# Patient Record
Sex: MALE | Race: WHITE
[De-identification: names, ages, dates, MRNs, and addresses within clinical notes are randomized per-mention and may not be internally consistent; named-entity substitution may affect disease eponyms.]

---
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
## 2023-08-31 ENCOUNTER — APPOINTMENT (OUTPATIENT)
Dept: URGENT CARE | Facility: CLINIC | Age: 23
End: 2023-08-31

## 2023-09-27 ENCOUNTER — HOSPITAL ENCOUNTER (EMERGENCY)
Facility: MEDICAL CENTER | Age: 23
End: 2023-09-27
Attending: EMERGENCY MEDICINE

## 2023-09-27 VITALS
WEIGHT: 172.4 LBS | SYSTOLIC BLOOD PRESSURE: 136 MMHG | TEMPERATURE: 98.4 F | HEART RATE: 69 BPM | DIASTOLIC BLOOD PRESSURE: 92 MMHG | HEIGHT: 69 IN | RESPIRATION RATE: 18 BRPM | OXYGEN SATURATION: 98 % | BODY MASS INDEX: 25.53 KG/M2

## 2023-09-27 DIAGNOSIS — L30.9 DERMATITIS: ICD-10-CM

## 2023-09-27 DIAGNOSIS — N34.2 URETHRITIS: ICD-10-CM

## 2023-09-27 PROCEDURE — 700111 HCHG RX REV CODE 636 W/ 250 OVERRIDE (IP): Mod: JZ | Performed by: EMERGENCY MEDICINE

## 2023-09-27 PROCEDURE — A9270 NON-COVERED ITEM OR SERVICE: HCPCS | Performed by: EMERGENCY MEDICINE

## 2023-09-27 PROCEDURE — 99284 EMERGENCY DEPT VISIT MOD MDM: CPT

## 2023-09-27 PROCEDURE — 700102 HCHG RX REV CODE 250 W/ 637 OVERRIDE(OP): Performed by: EMERGENCY MEDICINE

## 2023-09-27 PROCEDURE — 87491 CHLMYD TRACH DNA AMP PROBE: CPT

## 2023-09-27 PROCEDURE — 96372 THER/PROPH/DIAG INJ SC/IM: CPT

## 2023-09-27 PROCEDURE — 87591 N.GONORRHOEAE DNA AMP PROB: CPT

## 2023-09-27 RX ORDER — DOXYCYCLINE 100 MG/1
100 TABLET ORAL ONCE
Status: COMPLETED | OUTPATIENT
Start: 2023-09-27 | End: 2023-09-27

## 2023-09-27 RX ORDER — TRIAMCINOLONE ACETONIDE 0.25 MG/G
1 CREAM TOPICAL 2 TIMES DAILY
Qty: 80 G | Refills: 0 | Status: SHIPPED | OUTPATIENT
Start: 2023-09-27 | End: 2023-10-04

## 2023-09-27 RX ORDER — CEFTRIAXONE 500 MG/1
500 INJECTION, POWDER, FOR SOLUTION INTRAMUSCULAR; INTRAVENOUS ONCE
Status: COMPLETED | OUTPATIENT
Start: 2023-09-27 | End: 2023-09-27

## 2023-09-27 RX ORDER — DOXYCYCLINE 100 MG/1
100 CAPSULE ORAL 2 TIMES DAILY
Qty: 14 CAPSULE | Refills: 0 | Status: ACTIVE | OUTPATIENT
Start: 2023-09-27 | End: 2023-10-04

## 2023-09-27 RX ADMIN — DOXYCYCLINE 100 MG: 100 TABLET, FILM COATED ORAL at 20:09

## 2023-09-27 RX ADMIN — PENICILLIN G BENZATHINE 2.4 MILLION UNITS: 1200000 INJECTION, SUSPENSION INTRAMUSCULAR at 20:09

## 2023-09-27 RX ADMIN — CEFTRIAXONE SODIUM 500 MG: 500 INJECTION, POWDER, FOR SOLUTION INTRAMUSCULAR; INTRAVENOUS at 20:09

## 2023-09-28 LAB
C TRACH DNA SPEC QL NAA+PROBE: POSITIVE
N GONORRHOEA DNA SPEC QL NAA+PROBE: NEGATIVE
SPECIMEN SOURCE: ABNORMAL

## 2023-09-28 NOTE — ED TRIAGE NOTES
"Patient presents to the ER with the following complaints:    Chief Complaint   Patient presents with    Rash     Diffuse abdominal rash that comes up from the bilateral groin area.     Exposure to STD     Patient states he was called by a female partner regarding chlamydia.        BP (!) 150/97   Pulse 71   Temp 36.7 °C (98 °F) (Temporal)   Resp 18   Ht 1.753 m (5' 9\")   Wt 78.2 kg (172 lb 6.4 oz)   SpO2 97%   BMI 25.46 kg/m²       "

## 2023-09-28 NOTE — ED NOTES
Discharge instructions provided. Rx instructions reviewed. Pt verbalized understanding of discharge instructions to follow up with PCP and to return to ER if condition worsens. Pt ambulated out of ER without difficulty in good condition.

## 2023-09-28 NOTE — DISCHARGE INSTRUCTIONS
He need to establish and follow-up care with a primary care physician.  He will need a test of cure before he resumes sexual activities as you can still spread disease if it is not treated completely.

## 2023-09-28 NOTE — ED PROVIDER NOTES
ED Provider Note    CHIEF COMPLAINT  Chief Complaint   Patient presents with    Rash     Diffuse abdominal rash that comes up from the bilateral groin area.     Exposure to STD     Patient states he was called by a female partner regarding chlamydia.        HPI  Ivan Dowling is a 23 y.o. male who presents for evaluation of about 2 weeks of rash to the suprapubic region which is extending around the anterior portion of his waist and into the inguinal creases as well as symptoms of mild dysuria.  Patient notes that he had a call from a female sexual  noted that he needs to be treated for chlamydia.  Patient has no significant penile drainage and no testicular pain.  He has no ulcerations or redness/swelling of the scrotum, testicles, or penis.  EXTERNAL RECORDS REVIEWED  Reviewed patient's last ED visit 2 [place] emergency department January 2022 for STD evaluation.  Patient was treated with doxycycline at that time as well.   ROS  Constitutional: No fevers or chills  Skin: Rash as noted  HEENT: No sore throat, runny nose  Neck: No neck pain  Chest: No pain or rashes  Pulm: No shortness of breath, cough, wheezing, stridor, or pain with inspiration/expiration  Gastrointestinal: No nausea, vomiting, diarrhea, constipation, bloating, melena, hematochezia or abdominal pain.  Genitourinary: No dysuria or hematuria  Musculoskeletal: No pain, swelling, or weakness  Neurologic: No sensory or focal motor changes to extremities. No confusion or disorientation.  Heme: No bleeding or bruising problems.   Immuno: No hx of recurrent infections        LIMITATION TO HISTORY   None  OUTSIDE HISTORIAN(S):  None        PAST FAM HISTORY  History reviewed. No pertinent family history.    PAST MEDICAL HISTORY       SOCIAL HISTORY  Social History     Tobacco Use    Smoking status: Never    Smokeless tobacco: Never   Vaping Use    Vaping Use: Not on file   Substance and Sexual Activity    Alcohol use: Yes     Comment: socially     "Drug use: Not Currently    Sexual activity: Not on file       SURGICAL HISTORY  patient denies any surgical history    CURRENT MEDICATIONS  Home Medications       Reviewed by Allen Sherman R.N. (Registered Nurse) on 09/27/23 at 1750  Med List Status: Not Addressed     Medication Last Dose Status        Patient Ganga Taking any Medications                            ALLERGIES  Not on File    PHYSICAL EXAM  VITAL SIGNS: BP (!) 136/92   Pulse 69   Temp 36.9 °C (98.4 °F) (Temporal)   Resp 18   Ht 1.753 m (5' 9\")   Wt 78.2 kg (172 lb 6.4 oz)   SpO2 98%   BMI 25.46 kg/m²    Gen: Alert in no apparent distress.  HEENT: No signs of trauma, Bilateral external ears normal, Nose normal. Conjunctiva normal, Non-icteric.   Cardiovascular: Regular rate and rhythm, no murmurs.  Capillary refill less than 3 seconds to all extremities, 2+ distal pulses.  Thorax & Lungs: Normal breath sounds, No respiratory distress, No wheezing bilateral chest rise  Abdomen: Bowel sounds normal, Soft, No tenderness  : No testicular or penile lesions noted.  No drainage from the meatus.  No testicular swelling or tenderness.  No canker noted.  Skin: Warm, Dry.  Diffuse scaly rash to the suprapubic region extending around the anterior waistline with scattered excoriations.  Inguinal regions are slightly erythemic as well.  Extremities: Intact distal pulses, No edema  Neurologic: Alert , no facial droop, grossly normal coordination and strength  Psychiatric: Affect pleasant  ED observation? No      COURSE & MEDICAL DECISION MAKING  Pertinent Labs & Imaging studies reviewed. (See chart for details)  Patient arrives for evaluation of STD exposure to chlamydia and will be treated empirically for chlamydia, gonorrhea, and syphilis.  Patient does have a diffuse rash to his suprapubic region and anterior waistline which appears maculopapular, itchy, and slightly scaly.  This has the look of dermatitis and the patient notes it is pruritic.  There " are no visible arthropods and I feel treating the patient empirically with Kenalog cream for dermatitis is reasonable.  Patient does not have a facial rash and I do not suspect systemic illness as he does not appear ill and has no systemic issues.  Given his risk factors, I feel he needs close follow-up with his primary care physician and likely needs outpatient HIV testing.  I do not feel it needs to happen emergently however.  Patient states understanding this and will follow-up as directed.  He was given Rocephin, doxycycline, and Bicillin in the emergency department.  He was given a prescription for Kenalog cream.    I have discussed management of the patient with the following physicians and KHUSHBOO's: None    Escalation of care considered, and ultimately not performed:blood analysis    Barriers to care at this time, including but not limited to: None.     Decision tools and Rx drugs considered including, but not limited to : Multiple antibiotics for empiric treatment of STDs, steroid cream for rash     Discussion of management with other [facility] or appropriate source(s): None    The patient will not drink alcohol nor drive with prescribed medications. The patient will return for worsening symptoms and is stable at the time of discharge. The patient verbalizes understanding and will comply.    FINAL IMPRESSION  1. Urethritis    2. Dermatitis        Electronically signed by: Devin Eng M.D., 9/27/2023 7:21 PM

## 2023-10-01 NOTE — ED NOTES
"ED Positive Culture Follow-up/Notification Note:    Date: 9/30/23     Patient seen in the ED on 9/27/2023 for evaluation of rash in suprapubic region for last 2 weeks. Rash extends around anterior portion of waist and into inguinal creases. Patient also reports dysuria. Patient was notified by female sexual partner to seek treatment for chlamydia. Patient has no penile drainage, testicular pain, ulcerations of scrotum, redness or swelling of scrotum, testicles or penis.  Physical exam  : No testicular or penile lesions noted.  No drainage from the meatus.  No testicular swelling or tenderness. No canker noted.  Skin: Warm, Dry.  Diffuse scaly rash to the suprapubic region extending around the anterior waistline with scattered excoriations.  Inguinal regions are slightly erythemic as well.  Patient given triamcinolone cream for dermatitis and recommended to follow up with primary care provider and seek outpatient HIV testing. Patient given return precautions.  1. Urethritis    2. Dermatitis       Discharge Medication List as of 9/27/2023  8:26 PM        START taking these medications    Details   doxycycline (MONODOX) 100 MG capsule Take 1 Capsule by mouth 2 times a day for 7 days., Disp-14 Capsule, R-0, Normal      triamcinolone acetonide (KENALOG) 0.025 % Cream Apply 1 Application topically 2 times a day for 7 days., Disp-80 g, R-0, Normal             Allergies: Patient has no allergy information on record.     Vitals:    09/27/23 1743 09/27/23 2003   BP: (!) 150/97 (!) 136/92   Pulse: 71 69   Resp: 18 18   Temp: 36.7 °C (98 °F) 36.9 °C (98.4 °F)   TempSrc: Temporal Temporal   SpO2: 97% 98%   Weight: 78.2 kg (172 lb 6.4 oz)    Height: 1.753 m (5' 9\")        Final cultures:   Results       Procedure Component Value Units Date/Time    Chlamydia/GC, PCR (Urine) [[id number]]  (Abnormal) Collected: 09/27/23 1801    Order Status: Completed Specimen: Urine Updated: 09/28/23 1731     C. trachomatis by PCR POSITIVE     Gc " By Dna Probe Negative     Source Urine    Narrative:      Release to patient->Immediate          Plan:   Patient tested positive for chlamydia in the ER. Appropriately treated with ceftriaxone and doxycycline in ER. No additional treatment necessary. Spoke to the patient via telephone. Counseled the patient to abstain from sexual intercourse 7 days after antibiotic therapy is completed, to notify any sexual partners within the last 60 days to go the the [facility] for testing, to seek further HIV/STD testing, and to seek medical attention if symptoms persist. Patient voiced understanding and did not have any other questions.   Offered expedited partner therapy if patient interested. Patient will call back if interested.  Dutch Aguilar, PharmD

## 2023-10-23 ENCOUNTER — HOSPITAL ENCOUNTER (EMERGENCY)
Facility: MEDICAL CENTER | Age: 23
End: 2023-10-23
Attending: STUDENT IN AN ORGANIZED HEALTH CARE EDUCATION/TRAINING PROGRAM

## 2023-10-23 ENCOUNTER — APPOINTMENT (OUTPATIENT)
Dept: RADIOLOGY | Facility: MEDICAL CENTER | Age: 23
End: 2023-10-23
Attending: STUDENT IN AN ORGANIZED HEALTH CARE EDUCATION/TRAINING PROGRAM

## 2023-10-23 VITALS
SYSTOLIC BLOOD PRESSURE: 167 MMHG | HEART RATE: 77 BPM | DIASTOLIC BLOOD PRESSURE: 106 MMHG | OXYGEN SATURATION: 95 % | TEMPERATURE: 98.3 F | WEIGHT: 170 LBS | HEIGHT: 69 IN | RESPIRATION RATE: 17 BRPM | BODY MASS INDEX: 25.18 KG/M2

## 2023-10-23 DIAGNOSIS — M25.461 KNEE EFFUSION, RIGHT: ICD-10-CM

## 2023-10-23 DIAGNOSIS — M25.561 ACUTE PAIN OF RIGHT KNEE: ICD-10-CM

## 2023-10-23 PROCEDURE — 700102 HCHG RX REV CODE 250 W/ 637 OVERRIDE(OP): Performed by: STUDENT IN AN ORGANIZED HEALTH CARE EDUCATION/TRAINING PROGRAM

## 2023-10-23 PROCEDURE — A9270 NON-COVERED ITEM OR SERVICE: HCPCS | Performed by: STUDENT IN AN ORGANIZED HEALTH CARE EDUCATION/TRAINING PROGRAM

## 2023-10-23 PROCEDURE — 73564 X-RAY EXAM KNEE 4 OR MORE: CPT | Mod: RT

## 2023-10-23 PROCEDURE — 99284 EMERGENCY DEPT VISIT MOD MDM: CPT

## 2023-10-23 RX ORDER — ACETAMINOPHEN 500 MG
1000 TABLET ORAL ONCE
Status: COMPLETED | OUTPATIENT
Start: 2023-10-23 | End: 2023-10-23

## 2023-10-23 RX ORDER — OXYCODONE HYDROCHLORIDE 5 MG/1
5 TABLET ORAL ONCE
Status: COMPLETED | OUTPATIENT
Start: 2023-10-23 | End: 2023-10-23

## 2023-10-23 RX ADMIN — ACETAMINOPHEN 1000 MG: 500 TABLET, FILM COATED ORAL at 23:08

## 2023-10-23 RX ADMIN — OXYCODONE HYDROCHLORIDE 5 MG: 5 TABLET ORAL at 23:08

## 2023-10-24 NOTE — ED PROVIDER NOTES
"ED Provider Note    CHIEF COMPLAINT  Chief Complaint   Patient presents with    Knee Pain     Patient here for 8.5/10 aching/sharp right knee pain after getting up in a \"weird way\". Unable to weight on right leg. Patient with bilateral ACL repairs.        EXTERNAL RECORDS REVIEWED  Outpatient Notes patient was seen in the ER on 9/27/2023 for a rash and STD exposure.  He was diagnosed with urethritis.    HPI/ROS  LIMITATION TO HISTORY   Select: : None      Ivan Dowling is a 23 y.o. male who presents to the emergency department for evaluation of right knee pain.  Patient reports that he was attempting to rise from a seated position, pivoted on his right knee and experienced a popping sensation to his internal knee followed by severe pain and inability to flex or extend the knee.  This happened approximately 2 hours prior to arrival.  Report swelling to the knee as well.  He denies any pain in his ankle or hip or foot, numbness or weakness.  He states that he has injured his meniscus and had his ACL repaired previously in this knee years ago.    PAST MEDICAL HISTORY       SURGICAL HISTORY  patient denies any surgical history    FAMILY HISTORY  History reviewed. No pertinent family history.    SOCIAL HISTORY  Social History     Tobacco Use    Smoking status: Never    Smokeless tobacco: Never   Vaping Use    Vaping Use: Not on file   Substance and Sexual Activity    Alcohol use: Yes     Comment: socially    Drug use: Not Currently    Sexual activity: Not on file       CURRENT MEDICATIONS  Home Medications       Reviewed by Eneida Do R.N. (Registered Nurse) on 10/23/23 at 2100  Med List Status: Partial     Medication Last Dose Status        Patient Ganga Taking any Medications                           ALLERGIES  Not on File    PHYSICAL EXAM  VITAL SIGNS: BP (!) 130/92   Pulse 80   Temp 36.4 °C (97.6 °F) (Temporal)   Resp 18   Ht 1.753 m (5' 9\")   Wt 77.1 kg (170 lb)   SpO2 98%   BMI 25.10 kg/m²  "   Constitutional: No acute distress.  Cardiovascular: 2+ dorsalis pedis and posterior tibial pulse on the right.  Capillary refill less than 2 seconds in the right foot.  Musculoskeletal: Large right knee effusion and tenderness to palpation of the medial tibia femoral joint line.  Limited range of motion of the right knee with both flexion and extension, knee unable to fully extend.  Skin: Warm, Dry, No erythema, No rash.   Neurologic: Alert and oriented x 3, 5 out of 5 strength with dorsi and plantarflexion, EHL activation on the right and normal sensation throughout foot.  Psychiatric: Appropriate affect for situation      DIAGNOSTIC STUDIES / PROCEDURES    RADIOLOGY  I have independently interpreted the diagnostic imaging associated with this visit and am waiting the final reading from the radiologist.   My preliminary interpretation is as follows: No obvious bony deformity    Radiologist interpretation:   DX-KNEE COMPLETE 4+ RIGHT   Final Result         1.  No acute traumatic bony injury.            COURSE & MEDICAL DECISION MAKING    ED Observation Status? No; Patient does not meet criteria for ED Observation.     INITIAL ASSESSMENT, COURSE AND PLAN  Care Narrative:     Patient presents to the emergency department for evaluation of right knee pain and swelling.  Extremity is neurovascularly intact.  Examination concerning for meniscal injury leading to limited range of motion of the knee.  Mechanism of injury not consistent with patellar dislocation, knee dislocation, tibial plateau fracture.  X-ray with no bony abnormality.  Pain control provided with oxycodone and acetaminophen with improvement.  With repeated light axial traction and hip internal and external rotation gradually the range of motion return to the patient's right knee.  Given concern for bucket-handle meniscal tear Case was discussed with on-call orthopedist Dr. Duran who agrees with proposed management of hinged knee brace, crutches and  orthopedics follow-up.  A referral was provided and follow-up information given.  Return precautions discussed and all questions answered and the patient was discharged in stable condition.    ADDITIONAL PROBLEM LIST  Prior meniscus injury and ACL repair    DISPOSITION AND DISCUSSIONS  I have discussed management of the patient with the following physicians and KHUSHBOO's: Dr. Duran, orthopedic surgery    Discussion of management with other [facility] or appropriate source(s): None     Escalation of care considered, and ultimately not performed:diagnostic imaging do not feel that further imaging including CT scan to evaluate for occult fracture is indicated as above.    Barriers to care at this time, including but not limited to: Patient does not have established PCP.     Decision tools and prescription drugs considered including, but not limited to: Pain Medications acetaminophen, oxycodone .    FINAL IMPRESSION  1. Acute pain of right knee    2. Knee effusion, right        PRESCRIPTIONS  There are no discharge medications for this patient.      FOLLOW UP  [facility], Emergency Dept  [address] [address] [phone]    As needed, If symptoms worsen    Ziggy Duran M.D.  [address] [id number] [phone]    Schedule an appointment as soon as possible for a visit           -DISCHARGE-      Electronically signed by: Chalino Campbell M.D., 10/23/2023 9:59 PM

## 2023-10-24 NOTE — PROGRESS NOTES
Met with patient in ER to fit hinged knee brace on the RLE. Brace fits well and patient understands how to don and doff the brace. Brace will be taken home to wear per doctors orders following discharge from ER.

## 2023-10-24 NOTE — ED TRIAGE NOTES
"Chief Complaint   Patient presents with    Knee Pain     Patient here for 8.5/10 aching/sharp right knee pain after getting up in a \"weird way\". Unable to weight on right leg. Patient with bilateral ACL repairs.         Patient educated on triage process. Informed to notified ED staff of change in symptoms.     Vitals:    10/23/23 2038   BP: (!) 130/92   Pulse: 80   Resp: 18   Temp: 36.4 °C (97.6 °F)   SpO2: 98%           "

## 2023-10-24 NOTE — ED NOTES
Pt provided discharge instructions. Pt verbalized understanding of all instructions. Pt to lobby via wheelchair.

## 2023-10-24 NOTE — DISCHARGE INSTRUCTIONS
As we discussed I suspect that you have a torn meniscus.  Please wear your hinged knee brace and use crutches until you follow-up with orthopedic surgery.  Take Tylenol 1000 mg and ibuprofen 600 mg every 6 hours as needed for continued pain.    Elevate your knee at nighttime and apply ice for 15 minutes at a time.    Return to the ER with any numbness or weakness of your foot or ankle, severe pain, fever or if you are otherwise feeling worse.